# Patient Record
Sex: FEMALE | Race: BLACK OR AFRICAN AMERICAN | NOT HISPANIC OR LATINO | URBAN - METROPOLITAN AREA
[De-identification: names, ages, dates, MRNs, and addresses within clinical notes are randomized per-mention and may not be internally consistent; named-entity substitution may affect disease eponyms.]

---

## 2023-01-10 ENCOUNTER — HOSPITAL ENCOUNTER (EMERGENCY)
Facility: HOSPITAL | Age: 43
Discharge: HOME | End: 2023-01-11
Attending: EMERGENCY MEDICINE
Payer: MEDICAID

## 2023-01-10 DIAGNOSIS — N93.8 DUB (DYSFUNCTIONAL UTERINE BLEEDING): Primary | ICD-10-CM

## 2023-01-10 DIAGNOSIS — D50.0 IRON DEFICIENCY ANEMIA DUE TO CHRONIC BLOOD LOSS: ICD-10-CM

## 2023-01-10 DIAGNOSIS — N92.1 MENOMETRORRHAGIA: ICD-10-CM

## 2023-01-10 PROCEDURE — 80047 BASIC METABLC PNL IONIZED CA: CPT

## 2023-01-10 PROCEDURE — 99283 EMERGENCY DEPT VISIT LOW MDM: CPT

## 2023-01-10 PROCEDURE — 99284 PR EMERGENCY DEPT VISIT,LEVEL IV: ICD-10-PCS | Mod: ,,, | Performed by: EMERGENCY MEDICINE

## 2023-01-10 PROCEDURE — 99284 EMERGENCY DEPT VISIT MOD MDM: CPT | Mod: ,,, | Performed by: EMERGENCY MEDICINE

## 2023-01-10 PROCEDURE — 81025 URINE PREGNANCY TEST: CPT | Performed by: EMERGENCY MEDICINE

## 2023-01-10 PROCEDURE — 85025 COMPLETE CBC W/AUTO DIFF WBC: CPT | Performed by: EMERGENCY MEDICINE

## 2023-01-11 VITALS
WEIGHT: 185 LBS | RESPIRATION RATE: 18 BRPM | SYSTOLIC BLOOD PRESSURE: 163 MMHG | TEMPERATURE: 100 F | DIASTOLIC BLOOD PRESSURE: 104 MMHG | OXYGEN SATURATION: 98 % | HEIGHT: 64 IN | HEART RATE: 70 BPM | BODY MASS INDEX: 31.58 KG/M2

## 2023-01-11 LAB
B-HCG UR QL: NEGATIVE
BASOPHILS # BLD AUTO: 0.04 K/UL (ref 0–0.2)
BASOPHILS NFR BLD: 0.4 % (ref 0–1.9)
BUN SERPL-MCNC: 9 MG/DL (ref 6–30)
CHLORIDE SERPL-SCNC: 102 MMOL/L (ref 95–110)
CREAT SERPL-MCNC: 0.7 MG/DL (ref 0.5–1.4)
CTP QC/QA: YES
DIFFERENTIAL METHOD: ABNORMAL
EOSINOPHIL # BLD AUTO: 0.1 K/UL (ref 0–0.5)
EOSINOPHIL NFR BLD: 1.1 % (ref 0–8)
ERYTHROCYTE [DISTWIDTH] IN BLOOD BY AUTOMATED COUNT: 18.9 % (ref 11.5–14.5)
GLUCOSE SERPL-MCNC: 106 MG/DL (ref 70–110)
HCT VFR BLD AUTO: 26.6 % (ref 37–48.5)
HCT VFR BLD CALC: 29 %PCV (ref 36–54)
HGB BLD-MCNC: 7.6 G/DL (ref 12–16)
IMM GRANULOCYTES # BLD AUTO: 0.03 K/UL (ref 0–0.04)
IMM GRANULOCYTES NFR BLD AUTO: 0.3 % (ref 0–0.5)
LYMPHOCYTES # BLD AUTO: 2.9 K/UL (ref 1–4.8)
LYMPHOCYTES NFR BLD: 31.2 % (ref 18–48)
MCH RBC QN AUTO: 19.4 PG (ref 27–31)
MCHC RBC AUTO-ENTMCNC: 28.6 G/DL (ref 32–36)
MCV RBC AUTO: 68 FL (ref 82–98)
MONOCYTES # BLD AUTO: 0.5 K/UL (ref 0.3–1)
MONOCYTES NFR BLD: 5.8 % (ref 4–15)
NEUTROPHILS # BLD AUTO: 5.7 K/UL (ref 1.8–7.7)
NEUTROPHILS NFR BLD: 61.2 % (ref 38–73)
NRBC BLD-RTO: 0 /100 WBC
PLATELET # BLD AUTO: 225 K/UL (ref 150–450)
PMV BLD AUTO: 10.2 FL (ref 9.2–12.9)
POC IONIZED CALCIUM: 1.22 MMOL/L (ref 1.06–1.42)
POC TCO2 (MEASURED): 26 MMOL/L (ref 23–29)
POTASSIUM BLD-SCNC: 3.4 MMOL/L (ref 3.5–5.1)
RBC # BLD AUTO: 3.91 M/UL (ref 4–5.4)
SAMPLE: ABNORMAL
SODIUM BLD-SCNC: 138 MMOL/L (ref 136–145)
WBC # BLD AUTO: 9.25 K/UL (ref 3.9–12.7)

## 2023-01-11 RX ORDER — FERROUS SULFATE 325(65) MG
325 TABLET, DELAYED RELEASE (ENTERIC COATED) ORAL
Qty: 90 TABLET | Refills: 0 | Status: SHIPPED | OUTPATIENT
Start: 2023-01-11 | End: 2023-02-10

## 2023-01-11 NOTE — DISCHARGE INSTRUCTIONS
Please follow-up with gyn for further evaluation regarding your abnormal bleeding.  Return to emergency department if you began feeling lightheaded, dizzy as if you are going to pass out.

## 2023-01-11 NOTE — ED NOTES
Adult Physical Assessment  LOC: Raheem Hernandes, 42 y.o. female verified via two identifiers.  The patient is awake, alert, oriented and speaking appropriately at this time.  APPEARANCE: Patient resting comfortably and appears to be in no acute distress at this time. Patient is clean and well groomed, patient's clothing is properly fastened.  SKIN:The skin is warm and dry, color consistent with ethnicity, patient has normal skin turgor and moist mucus membranes, skin intact, no breakdown or brusing noted.  MUSCULOSKELETAL: Patient moving all extremities well, no obvious swelling or deformities noted.  RESPIRATORY: Airway is open and patent, respirations are spontaneous, patient has a normal effort and rate, no accessory muscle use noted.  CARDIAC: Patient has a normal rate and rhythm, no periphreal edema noted in any extremity, capillary refill < 3 seconds in all extremities  ABDOMEN: Soft and non tender to palpation, no abdominal distention noted. Bowel sounds present in all four quadrants. Pt c/o vaginal bleeding.   NEUROLOGIC: Eyes open spontaneously, behavior appropriate to situation, follows commands, facial expression symmetrical, bilateral hand grasp equal and even, purposeful motor response noted, normal sensation in all extremities when touched with a finger.

## 2023-01-11 NOTE — ED TRIAGE NOTES
Raheem Hernandes, an 42 y.o. female presents to the ED for vaginal bleeding that began today. Has since saturated 5 pads. Menstral cycle began around DINH.       Review of patient's allergies indicates:  No Known Allergies  Chief Complaint   Patient presents with    Vaginal Bleeding     Vaginal bleeding that started today. Saturated about 5 pads since this morning. Pt reports starting her cycle around DINH.      No past medical history on file.

## 2023-01-11 NOTE — ED PROVIDER NOTES
Encounter Date: 1/10/2023    SCRIBE #1 NOTE: I, Ashley Solomon, am scribing for, and in the presence of,  Peg Chapman MD. I have scribed the following portions of the note - Other sections scribed: HPI, ROS, PE.     History     Chief Complaint   Patient presents with    Vaginal Bleeding     Vaginal bleeding that started today. Saturated about 5 pads since this morning. Pt reports starting her cycle around DINH.      Raheem Hernandes is a 42 y.o. female with no significant past medical history presents with a complaint of  vaginal bleeding for the past 10 days, which has been constant. She affirms passing clots, and  experiencing a heavier menstrual cycle than normal. She tried using a pad to control the bleeding, with no improvement. Patient saturated 5 pads this morning. No other exacerbating or alleviating factors. Patient denies shortness of breath, lightheadedness or other associated symptoms. Denies possible pregnancy or taking hormone therapy. LMP 12/31    The history is provided by the patient and medical records. No  was used.     Review of patient's allergies indicates:  No Known Allergies  No past medical history on file.  No past surgical history on file.  No family history on file.     Review of Systems   Constitutional:  Negative for fever.   HENT:  Negative for sore throat.    Respiratory:  Negative for shortness of breath.    Cardiovascular:  Negative for chest pain.   Gastrointestinal:  Negative for nausea.   Genitourinary:  Positive for vaginal discharge. Negative for dysuria.   Musculoskeletal:  Negative for back pain.   Skin:  Negative for rash.   Neurological:  Negative for weakness and light-headedness.   Hematological:  Does not bruise/bleed easily.     Physical Exam     Initial Vitals [01/10/23 2147]   BP Pulse Resp Temp SpO2   (!) 172/79 88 15 99.7 °F (37.6 °C) 100 %      MAP       --         Physical Exam    Nursing note and vitals reviewed.  Constitutional: She appears  well-developed and well-nourished. She is not diaphoretic. No distress.   HENT:   Head: Normocephalic and atraumatic.   Mouth/Throat: Oropharynx is clear and moist.   Eyes: Conjunctivae are normal.   Neck: Neck supple. No JVD present.   Normal range of motion.  Cardiovascular:  Normal rate, regular rhythm and intact distal pulses.           Pulmonary/Chest: Breath sounds normal. No respiratory distress. She has no wheezes. She has no rhonchi. She has no rales.   Abdominal: Abdomen is soft. She exhibits no distension. There is no abdominal tenderness.   Genitourinary:    No vaginal bleeding.   No bleeding in the vagina.    Genitourinary Comments: Mild vaginal bleeding. No large clots. Cervix appears normal.      Musculoskeletal:         General: No tenderness or edema. Normal range of motion.      Cervical back: Normal range of motion and neck supple.     Neurological: She is alert and oriented to person, place, and time.   Skin: Skin is warm and dry.       ED Course   Procedures  Labs Reviewed   CBC W/ AUTO DIFFERENTIAL - Abnormal; Notable for the following components:       Result Value    RBC 3.91 (*)     Hemoglobin 7.6 (*)     Hematocrit 26.6 (*)     MCV 68 (*)     MCH 19.4 (*)     MCHC 28.6 (*)     RDW 18.9 (*)     All other components within normal limits   ISTAT PROCEDURE - Abnormal; Notable for the following components:    POC Potassium 3.4 (*)     POC Hematocrit 29 (*)     All other components within normal limits   POCT URINE PREGNANCY   ISTAT CHEM8          Imaging Results    None          Medications - No data to display  Medical Decision Making:   History:   Old Medical Records: I decided to obtain old medical records.  Initial Assessment:   Emergent evaluation a 42-year-old female presenting today for prolonged, irregular vaginal bleeding.  Hypertensive otherwise vital signs stable  Denies weakness, dizziness, lightheadedness, shortness of breath  Differential Diagnosis:   Severe anemia requiring  transfusion, symptomatic anemia, iron deficiency anemia, dysfunctional uterine bleeding.    Clinical Tests:   Lab Tests: Ordered and Reviewed  ED Management:  - Labs        Scribe Attestation:   Scribe #1: I performed the above scribed service and the documentation accurately describes the services I performed. I attest to the accuracy of the note.      ED Course as of 01/11/23 0036   Wed Jan 11, 2023   0022 Hemoglobin(!): 7.6 [GM]   0024 MCV(!): 68 [GM]   0025 POC BUN: 9 [GM]   0025 POC Creatinine: 0.7 [GM]      ED Course User Index  [GM] Peg Chapman MD        Labs reviewed, no white count.  Hemoglobin 7.6, MCV 68 consistent with microcytic anemia.  No previous labs for comparison.  I-STAT otherwise unremarkable.  She denies any symptoms of anemia at this time.  I do not think she requires an emergent transfusion at this time.  I recommended she follow-up with gyn within the next week for further evaluation.  She expressed understanding.  Patient given strict return precautions.     I Peg Chapman have personally performed the services described in this documentation by the scribe, in my presence, and it is both accurate and complete.    Clinical Impression:   Final diagnoses:  [N93.8] DUB (dysfunctional uterine bleeding) (Primary)  [D50.0] Iron deficiency anemia due to chronic blood loss  [N92.1] Menometrorrhagia        ED Disposition Condition    Discharge Stable          ED Prescriptions       Medication Sig Dispense Start Date End Date Auth. Provider    ferrous sulfate 325 (65 FE) MG EC tablet Take 1 tablet (325 mg total) by mouth 3 (three) times daily with meals. 90 tablet 1/11/2023 2/10/2023 Peg Chapman MD          Follow-up Information       Follow up With Specialties Details Why Contact Info      Call   Please follow-up with gyn regarding dysfunctional uterine bleeding.             Peg Chapman MD  01/11/23 1213

## 2023-01-11 NOTE — ED NOTES
I-STAT Chem-8+ Results:   Value Reference Range   Sodium 138 136-145 mmol/L   Potassium  3.4 3.5-5.1 mmol/L   Chloride 102  mmol/L   Ionized Calcium 1.22 1.06-1.42 mmol/L   CO2 (measured) 26 23-29 mmol/L   Glucose 106  mg/dL   BUN 9 6-30 mg/dL   Creatinine 0.7 0.5-1.4 mg/dL   Hematocrit 29 36-54%

## 2023-07-02 ENCOUNTER — HOSPITAL ENCOUNTER (EMERGENCY)
Facility: HOSPITAL | Age: 43
Discharge: LEFT BEFORE DISCHARGE | End: 2023-07-02
Attending: EMERGENCY MEDICINE
Payer: MEDICAID

## 2023-07-02 VITALS
OXYGEN SATURATION: 99 % | WEIGHT: 185 LBS | SYSTOLIC BLOOD PRESSURE: 135 MMHG | BODY MASS INDEX: 31.58 KG/M2 | HEIGHT: 64 IN | DIASTOLIC BLOOD PRESSURE: 79 MMHG | RESPIRATION RATE: 16 BRPM | HEART RATE: 77 BPM | TEMPERATURE: 99 F

## 2023-07-02 DIAGNOSIS — I82.4Y1 ACUTE DEEP VEIN THROMBOSIS (DVT) OF PROXIMAL VEIN OF RIGHT LOWER EXTREMITY: ICD-10-CM

## 2023-07-02 DIAGNOSIS — I26.99 PULMONARY EMBOLISM, UNSPECIFIED CHRONICITY, UNSPECIFIED PULMONARY EMBOLISM TYPE, UNSPECIFIED WHETHER ACUTE COR PULMONALE PRESENT: Primary | ICD-10-CM

## 2023-07-02 LAB
ANION GAP SERPL CALC-SCNC: 9 MMOL/L (ref 8–16)
BUN SERPL-MCNC: 10 MG/DL (ref 6–20)
CALCIUM SERPL-MCNC: 9 MG/DL (ref 8.7–10.5)
CHLORIDE SERPL-SCNC: 107 MMOL/L (ref 95–110)
CO2 SERPL-SCNC: 22 MMOL/L (ref 23–29)
CREAT SERPL-MCNC: 0.7 MG/DL (ref 0.5–1.4)
EST. GFR  (NO RACE VARIABLE): >60 ML/MIN/1.73 M^2
GLUCOSE SERPL-MCNC: 119 MG/DL (ref 70–110)
POTASSIUM SERPL-SCNC: 4.4 MMOL/L (ref 3.5–5.1)
SODIUM SERPL-SCNC: 138 MMOL/L (ref 136–145)

## 2023-07-02 PROCEDURE — 80048 BASIC METABOLIC PNL TOTAL CA: CPT | Performed by: EMERGENCY MEDICINE

## 2023-07-02 PROCEDURE — 25000003 PHARM REV CODE 250: Performed by: EMERGENCY MEDICINE

## 2023-07-02 PROCEDURE — 99283 EMERGENCY DEPT VISIT LOW MDM: CPT

## 2023-07-02 RX ADMIN — APIXABAN 10 MG: 5 TABLET, FILM COATED ORAL at 09:07

## 2023-07-02 NOTE — ED PROVIDER NOTES
"Encounter Date: 7/2/2023       History     Chief Complaint   Patient presents with    Deep Vein Thrombosis     Recent hospitalized at [facility] with PE and DVT. Was discharged home with Eloquis.  Took one dose Thursday and stopped. Pt states "I didn't knoe I had to keep taking them. Pt overheard telling registration that she stopped because she doesn't like taking medication. Pt returned today because of RLE swelling. Missed follow up appt because of her son's court appearance.     The patient is a 42-year-old female who presents to the emergency department with right lower extremity swelling.  She states that she noticed the swelling last weekend and went to the [facility] emergency department.  There, she was diagnosed with a right lower extremity DVT by ultrasound.  She also reported shortness of breath at the time.  CT scan confirmed a PE.  The patient was admitted to the hospital for 3 days and then discharged with a prescription for Eliquis.  She states that she took 1 dose and then stopped taking the medication.  She has not taken the medication in 4 days.  She presents here because her right lower extremity swelling is worse.  She denies any shortness of breath or chest pain.  She has no other complaints.    Review of patient's allergies indicates:  No Known Allergies  Past Medical History:   Diagnosis Date    DVT (deep venous thrombosis)     Other pulmonary embolism without acute cor pulmonale      History reviewed. No pertinent surgical history.  History reviewed. No pertinent family history.  Social History     Tobacco Use    Smoking status: Never    Smokeless tobacco: Never   Substance Use Topics    Alcohol use: Not Currently    Drug use: Never     Review of Systems  General: Denies generalized weakness.  Cardiovascular: Denies chest pain.  Denies shortness of breath.  Denies orthopnea.  Denies dyspnea on exertion.  Respiratory: Denies shortness of breath.  Denies wheezing.  Denies coughing.  GI: Denies " abdominal pain.  Denies melena.  Denies hematochezia.  : Denies dysuria.  Denies hematuria.    Skin: Denies rashes.  Denies lesions.  Denies pallor.  Denies bruising.  Musculoskeletal: Denies neck pain.  Denies back pain.  Denies extremity pain.  Reports mild right lower extremity swelling.      Physical Exam     Initial Vitals [07/02/23 0845]   BP Pulse Resp Temp SpO2   135/79 77 16 98.7 °F (37.1 °C) 99 %      MAP       --         Physical Exam  General: No apparent distress.  Well-nourished.  Well-developed.  Alert and oriented x3.  Cardiovascular: Regular rate and rhythm.  No murmurs, rubs, or gallops.  Brisk capillary fill.  2+ distal pulses.  Respiratory: Clear to auscultation bilaterally.  No wheezes, rales, or rhonchi.  No respiratory distress.  Abdomen: Soft.  Nontender.  Nondistended.  No guarding.  No rebound.  No masses.  No abdominal bruit auscultated.  Skin: No rashes.  No lesions.  No pallor.  No jaundice.  Musculoskeletal:  2+ right lower extremity pitting edema is noted.  No significant calf tenderness.  Negative Homans sign.  No palpable cord.      ED Course   Procedures  Labs Reviewed   BASIC METABOLIC PANEL - Abnormal; Notable for the following components:       Result Value    CO2 22 (*)     Glucose 119 (*)     All other components within normal limits   POCT URINE PREGNANCY          Imaging Results    None          Medications   apixaban tablet 10 mg (10 mg Oral Given 7/2/23 0952)     Medical Decision Making:   Initial Assessment:   This is an emergent evaluation.  The patient has a known PE and DVT.  She has been noncompliant with her Eliquis over the past 4 days.  She presents with worsening right lower extremity swelling.  Unfortunately, we do not have the patient's medical records in our EHR.  I will check a renal function and restart Eliquis.  Because the patient has no worsening chest pain or shortness of breath, I do not feel strongly that repeat imaging studies are necessary to assess  for PE.  She is neurovascularly intact distally.  I do not feel strongly that right lower extremity ultrasound is warranted.  If the renal function is normal, I will provide the patient with a dose of Eliquis 10 mg orally and discharge her with instructions to continue taking her Eliquis as prescribed.  ED Management:  10:18 a.m.   Renal function is within normal limits.  Once the UPT results as negative, the patient will be discharged.  She has been instructed to take Eliquis as prescribed.  I will give a dose of 10 mg orally now.                        Clinical Impression:   Final diagnoses:  [I26.99] Pulmonary embolism, unspecified chronicity, unspecified pulmonary embolism type, unspecified whether acute cor pulmonale present (Primary)  [I82.4Y1] Acute deep vein thrombosis (DVT) of proximal vein of right lower extremity        ED Disposition Condition    Discharge Stable          ED Prescriptions    None       Follow-up Information       Follow up With Specialties Details Why Contact Info Additional Information    [facility] Internal Medicine In 1 week or your PCP or a Community Clinic [address]  [facility] [id number] [phone] [facility] Please park in surface lot and check in at central registration desk             Louie Gallagher MD  07/02/23 2403

## 2023-07-02 NOTE — DISCHARGE INSTRUCTIONS
Continue taking the medication that was prescribed to you by the physician at [facility].

## 2023-07-02 NOTE — ED NOTES
"Pt left premises without providing urine sample. Pt stated she did not want to because "my tubes are tied" despite being told how pregnancy can still happen and requesting a urine sample the patient left. Iv was removed  "

## 2023-07-02 NOTE — ED TRIAGE NOTES
"Recent hospitalized at [facility] with PE and DVT. Was discharged home with Eloquis. Took one dose Thursday and stopped. Pt states "I didn't knoe I had to keep taking them. Pt overheard telling registration that she stopped because she doesn't like taking medication. Pt returned today because of RLE swelling. Missed follow up appt because of her son's court appearance.   "

## 2023-07-02 NOTE — ED NOTES
Patient identifiers verified and correct for Raheem Hernandes   LOC: The patient is awake, alert and aware of environment with an appropriate affect, the patient is oriented x 3 and speaking appropriately.   APPEARANCE: Patient appears comfortable and in no acute distress, patient is clean and well groomed.  SKIN: The skin is warm and dry, color consistent with ethnicity, patient has normal skin turgor and moist mucus membranes, skin intact   MUSCULOSKELETAL: Patient moving all extremities spontaneously, swelling noted to right lower extremity.   RESPIRATORY: Airway is open and patent, respirations are spontaneous, patient has a normal effort and rate, no accessory muscle use noted, O2 sats noted at 97% on room air.  CARDIAC: Patient has a normal rate and regular rhythm, no edema noted, capillary refill < 3 seconds.   GASTRO: Soft and non tender to palpation, no distention noted, normoactive bowel sounds present in all four quadrants. Pt states bowel movements have been regular.  : Pt denies any pain or frequency with urination.  NEURO: Pt opens eyes spontaneously, behavior appropriate to situation, follows commands, facial expression symmetrical, bilateral hand grasp equal and even, purposeful motor response noted, normal sensation in all extremities when touched with a finger.

## 2024-01-11 ENCOUNTER — HOSPITAL ENCOUNTER (OUTPATIENT)
Facility: HOSPITAL | Age: 44
Discharge: LEFT BEFORE DISCHARGE | DRG: 607 | End: 2024-01-12
Attending: EMERGENCY MEDICINE | Admitting: STUDENT IN AN ORGANIZED HEALTH CARE EDUCATION/TRAINING PROGRAM
Payer: MEDICAID

## 2024-01-11 DIAGNOSIS — R07.9 CHEST PAIN: ICD-10-CM

## 2024-01-11 DIAGNOSIS — S90.562A INSECT BITE OF LEFT ANKLE, INITIAL ENCOUNTER: Primary | ICD-10-CM
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
## 2024-01-11 DIAGNOSIS — I80.9 THROMBOPHLEBITIS: ICD-10-CM

## 2024-01-11 DIAGNOSIS — L03.116 CELLULITIS OF LEFT FOOT: ICD-10-CM

## 2024-01-11 DIAGNOSIS — L03.116 CELLULITIS OF LEFT LOWER LEG: ICD-10-CM

## 2024-01-11 PROCEDURE — 96365 THER/PROPH/DIAG IV INF INIT: CPT

## 2024-01-11 PROCEDURE — 63600175 PHARM REV CODE 636 W HCPCS: Mod: JZ,JG

## 2024-01-11 PROCEDURE — 80053 COMPREHEN METABOLIC PANEL: CPT

## 2024-01-11 PROCEDURE — 99285 EMERGENCY DEPT VISIT HI MDM: CPT | Mod: 25

## 2024-01-11 PROCEDURE — G0378 HOSPITAL OBSERVATION PER HR: HCPCS

## 2024-01-11 PROCEDURE — 86803 HEPATITIS C AB TEST: CPT | Performed by: PHYSICIAN ASSISTANT

## 2024-01-11 PROCEDURE — 87389 HIV-1 AG W/HIV-1&-2 AB AG IA: CPT | Performed by: PHYSICIAN ASSISTANT

## 2024-01-11 PROCEDURE — 25000003 PHARM REV CODE 250

## 2024-01-11 PROCEDURE — 85025 COMPLETE CBC W/AUTO DIFF WBC: CPT

## 2024-01-11 PROCEDURE — 12000002 HC ACUTE/MED SURGE SEMI-PRIVATE ROOM

## 2024-01-11 PROCEDURE — 25500020 PHARM REV CODE 255: Performed by: EMERGENCY MEDICINE

## 2024-01-11 PROCEDURE — 96365 THER/PROPH/DIAG IV INF INIT: CPT | Mod: 59

## 2024-01-11 RX ORDER — CLINDAMYCIN PHOSPHATE 900 MG/50ML
900 INJECTION, SOLUTION INTRAVENOUS
Status: COMPLETED | OUTPATIENT
Start: 2024-01-11 | End: 2024-01-11

## 2024-01-11 RX ORDER — ACETAMINOPHEN 500 MG
1000 TABLET ORAL
Status: COMPLETED | OUTPATIENT
Start: 2024-01-11 | End: 2024-01-11

## 2024-01-11 RX ADMIN — ACETAMINOPHEN 1000 MG: 500 TABLET ORAL at 06:01

## 2024-01-11 RX ADMIN — IOHEXOL 75 ML: 350 INJECTION, SOLUTION INTRAVENOUS at 08:01

## 2024-01-11 RX ADMIN — CLINDAMYCIN IN 5 PERCENT DEXTROSE 900 MG: 18 INJECTION, SOLUTION INTRAVENOUS at 06:01

## 2024-01-11 NOTE — Clinical Note
Diagnosis: Cellulitis of left foot [[id number]]   Future Attending Provider: SIRISHA VANEGAS [[id number]]   Reason for IP Medical Treatment  (Clinical interventions that can only be accomplished in the IP setting? ) :: Extensive soft tissue swelling of lower leg, thrombocytopenia   I certify that Inpatient services for greater than or equal to 2 midnights are medically necessary:: Yes   Plans for Post-Acute care--if anticipated (pick the single best option):: A. No post acute care anticipated at this time

## 2024-01-11 NOTE — ED PROVIDER NOTES
Encounter Date: 1/11/2024       History     Chief Complaint   Patient presents with    Insect Bite     Pt states she was bitten by a spider yesterday on her left ankle.  Now having swelling     43 year old female with history of anemia and PE on Eliquis presents to the ED regarding an insect bite to left ankle onset yesterday. States she was in her car when she felt something sting/bite her but was unable to catch whatever insect it was. She states it itched at first but this has resolved. Reports the swelling and pain worsened today so came to ED for evaluation. Denies shortness of breath, throat swelling, fever, chills, or any other symptoms today.    The history is provided by the patient and medical records.     Review of patient's allergies indicates:  No Known Allergies  Past Medical History:   Diagnosis Date    DVT (deep venous thrombosis)     Other pulmonary embolism without acute cor pulmonale      No past surgical history on file.  No family history on file.  Social History     Tobacco Use    Smoking status: Never    Smokeless tobacco: Never   Substance Use Topics    Alcohol use: Not Currently    Drug use: Never     Review of Systems   Constitutional:  Negative for fever.   HENT:  Negative for sore throat.    Respiratory:  Negative for shortness of breath.    Cardiovascular:  Positive for leg swelling. Negative for chest pain.   Gastrointestinal:  Negative for nausea.   Genitourinary:  Negative for dysuria.   Musculoskeletal:  Negative for back pain.   Skin:  Positive for wound. Negative for rash.   Neurological:  Negative for weakness.   Hematological:  Does not bruise/bleed easily.       Physical Exam     Initial Vitals [01/11/24 1439]   BP Pulse Resp Temp SpO2   (!) 183/84 87 16 98.7 °F (37.1 °C) 98 %      MAP       --         Physical Exam    Vitals reviewed.  Constitutional: She appears well-developed and well-nourished. She is not diaphoretic. No distress.   HENT:   Head: Normocephalic and  atraumatic.   Cardiovascular:  Normal rate and intact distal pulses.           Pulmonary/Chest: No respiratory distress.   Musculoskeletal:      Comments: Wound noted overlying left lateral malleolus with yellow drainage. Edema to left foot and calf with induration     Neurological: She is alert and oriented to person, place, and time. She has normal strength. No sensory deficit.   Psychiatric: She has a normal mood and affect.               ED Course   Procedures  Labs Reviewed   CBC W/ AUTO DIFFERENTIAL - Abnormal; Notable for the following components:       Result Value    RBC 3.85 (*)     Hemoglobin 7.9 (*)     Hematocrit 26.6 (*)     MCV 69 (*)     MCH 20.5 (*)     MCHC 29.7 (*)     RDW 20.2 (*)     Platelets 80 (*)     Platelet Estimate Decreased (*)     All other components within normal limits   COMPREHENSIVE METABOLIC PANEL - Abnormal; Notable for the following components:    CO2 22 (*)     Calcium 8.5 (*)     Albumin 3.3 (*)     Alkaline Phosphatase 27 (*)     Anion Gap 4 (*)     All other components within normal limits   HIV 1 / 2 ANTIBODY    Narrative:     Release to patient->Immediate   HEPATITIS C ANTIBODY    Narrative:     Release to patient->Immediate   POCT URINE PREGNANCY          Imaging Results               CT Leg (Tibia-Fibula) Wtih Contrast Left (Final result)  Result time 01/11/24 21:38:13      Final result by Adriano Hayward MD (01/11/24 21:38:13)                   Impression:      Left Foot CT and Left Lower Leg (Tibia-Fibula) CT:    CT findings suggest a left femoropopliteal DVT, possibly on the basis of septic thrombophlebitis.  Consider sonography to confirm DVT and determinate its cephalic extent.    Extensive soft tissue changes in the LEFT FOOT and LEFT LOWER LEG, compatible skin and subcutaneous soft tissue infection.    No definite involvement of the muscle compartments is demonstrated.    No rim enhancing abscess or soft tissue emphysema.    Additional observations as detailed in  the body of the report.    This report was flagged in Epic as abnormal.      Electronically signed by: Adriano Mollybasilia  Date:    01/11/2024  Time:    21:38               Narrative:    EXAMINATION:  CT LEG (TIBIA-FIBULA) WITH CONTRAST LEFT; CT FOOT WITH CONTRAST LEFT    CLINICAL HISTORY:  Soft tissue mass, lower leg, deep;concerned for poss nec fas;    TECHNIQUE:  Helical CT scanning of the left tibia and fibula, and left foot, was performed from the level of the distal thigh through the toes, with intravenous administration of 75 mL Omnipaque 350.   imaging, axial images, and sagittal and coronal multiplanar two-dimensional reformations of the left tibia-fibula and left foot are submitted and reviewed.    COMPARISON:  None    FINDINGS:  Left Foot CT and Left Lower Leg (Tibia-Fibula) CT:    There is extensive subcutaneous soft tissue stranding, most profound in the anterior dorsal aspect of the left foot, lateral aspect of the left ankle, and anterolateral aspect of the left lower leg.  There is some edema tracking along the interface of the subcutaneous fat and underlying muscle.  However no definite intramuscular or intermuscular involvement is demonstrated.    No discrete rim enhancing soft tissue fluid collection, radiopaque foreign body, or soft tissue emphysema is demonstrated.  However, please be aware that the absence of visible soft tissue emphysema does not fully excluded necrotizing infection, because approximately 5-15% of necrotizing infections do not demonstrate visible gas on imaging studies.    At this time, CT demonstrates no evidence of septic arthritis, osteomyelitis, acute fracture deformity or dislocation in the visualized portion of the left lower extremity.    The left popliteal vein and visualized distal segment of the left femoral vein demonstrate luminal distention, wall thickening, and no substantial luminal enhancement (in contradistinction to the left greater saphenous vein, which is  well opacified on the same images).    Trace fluid in the suprapatellar recess of the left knee, and partially visualized suprapatellar recess of the right knee, possibly physiologic.    Mild degenerative changes, most apparent in the left knee.                                        CT Foot With Contrast Left (Final result)  Result time 01/11/24 21:38:13      Final result by Adriano Hayward MD (01/11/24 21:38:13)                   Impression:      Left Foot CT and Left Lower Leg (Tibia-Fibula) CT:    CT findings suggest a left femoropopliteal DVT, possibly on the basis of septic thrombophlebitis.  Consider sonography to confirm DVT and determinate its cephalic extent.    Extensive soft tissue changes in the LEFT FOOT and LEFT LOWER LEG, compatible skin and subcutaneous soft tissue infection.    No definite involvement of the muscle compartments is demonstrated.    No rim enhancing abscess or soft tissue emphysema.    Additional observations as detailed in the body of the report.    This report was flagged in Epic as abnormal.      Electronically signed by: Adriano Hayward  Date:    01/11/2024  Time:    21:38               Narrative:    EXAMINATION:  CT LEG (TIBIA-FIBULA) WITH CONTRAST LEFT; CT FOOT WITH CONTRAST LEFT    CLINICAL HISTORY:  Soft tissue mass, lower leg, deep;concerned for poss nec fas;    TECHNIQUE:  Helical CT scanning of the left tibia and fibula, and left foot, was performed from the level of the distal thigh through the toes, with intravenous administration of 75 mL Omnipaque 350.   imaging, axial images, and sagittal and coronal multiplanar two-dimensional reformations of the left tibia-fibula and left foot are submitted and reviewed.    COMPARISON:  None    FINDINGS:  Left Foot CT and Left Lower Leg (Tibia-Fibula) CT:    There is extensive subcutaneous soft tissue stranding, most profound in the anterior dorsal aspect of the left foot, lateral aspect of the left ankle, and anterolateral  aspect of the left lower leg.  There is some edema tracking along the interface of the subcutaneous fat and underlying muscle.  However no definite intramuscular or intermuscular involvement is demonstrated.    No discrete rim enhancing soft tissue fluid collection, radiopaque foreign body, or soft tissue emphysema is demonstrated.  However, please be aware that the absence of visible soft tissue emphysema does not fully excluded necrotizing infection, because approximately 5-15% of necrotizing infections do not demonstrate visible gas on imaging studies.    At this time, CT demonstrates no evidence of septic arthritis, osteomyelitis, acute fracture deformity or dislocation in the visualized portion of the left lower extremity.    The left popliteal vein and visualized distal segment of the left femoral vein demonstrate luminal distention, wall thickening, and no substantial luminal enhancement (in contradistinction to the left greater saphenous vein, which is well opacified on the same images).    Trace fluid in the suprapatellar recess of the left knee, and partially visualized suprapatellar recess of the right knee, possibly physiologic.    Mild degenerative changes, most apparent in the left knee.                                       Medications   clindamycin in D5W 900 mg/50 mL IVPB 900 mg (0 mg Intravenous Stopped 1/11/24 1919)   acetaminophen tablet 1,000 mg (1,000 mg Oral Given 1/11/24 1816)   iohexoL (OMNIPAQUE 350) injection 75 mL (75 mLs Intravenous Given 1/11/24 2007)     Medical Decision Making  Amount and/or Complexity of Data Reviewed  Labs: ordered. Decision-making details documented in ED Course.  Radiology: ordered. Decision-making details documented in ED Course.    Risk  OTC drugs.  Prescription drug management.  Decision regarding hospitalization.         APC / Resident Notes:   43 year old female with history of PE on Eliquis presents to the ED regarding an insect bite to left ankle onset  yesterday with left lower leg swelling. Hypertensive with other vitals stable. See physical exam findings above. Given quick onset of edema after insect bite, will obtain CT and give IV clindamycin with concerns for possible necrotizing fasciitis.  Lower suspicion for DVT as patient is on Eliquis.    My differential diagnoses include but are not limited to:   Cellulitis, DVT, necrotizing fasciitis, osteomyelitis    See ED course.  I have reviewed the patient's records and discussed with my supervising physician.       Attending Attestation:     Physician Attestation Statement for NP/PA:   I personally made/approved the management plan and take responsibility for the patient management.    Other NP/PA Attestation Additions:    History of Present Illness: This is a 43-year-old female who presents to the emergency department stating that yesterday when she was in her car she felt something sting her left lower extremity around her lateral malleolus.  It was acutely uncomfortable and since then she is noticed that she has a wound there that has been draining a serous appearing fluid intermittently.  She also has noted that since that time she has felt more swelling up into her more proximal left lower leg that includes much of her shin and calf.  She feels as though there is some mild redness in the area but it is primarily focal around the wound.  She has not had any fevers or chills.  No chest pain or shortness of breath.  No abdominal pain vomiting or diarrhea.  Her presentation is complicated by the fact that she has a history of DVT and PE and is on Eliquis.   Physical Exam: VS upon arrival: 183/84; 87; 16; afebrile; 98% room air.  Consititutional: Pt is awake, alert, and oriented x 4. Does not appear to be in any augustina distress.  HEENT: PERRL; EOMI; nares patent; op clear; mmm without lesions.  Neck: Supple with good ROM.  CV: Normal rate; regular rhythm; no mrg. Heart sounds normal. 2+ radials bilateral and  symmetric.  Respiratory: CTA bilaterally with no focal rales, ronchi, or wheezes.  GI: Abdomen soft, NTND. No rebound. No guarding. BS normal.  MSK:  The AP P note characterizes the left lower extremity with images documented.  You can see that there is an actual wound to the lateral lower leg around the lateral malleolus that is actively draining just a bit of serous material at this time.  There is also a warmth to the skin around that area that emanates a bit up the calf and the shin with some subtle redness.  There is no crepitus.  All compartments are soft.  She has 2+ dorsalis pedis pulses bilateral and symmetric.  She has 5/5 dorsiflexion and plantar flexion with positive EHL.  Neuro: MAEW.  Left lower extremity as above.  Skin:  Left lower extremity as above.     Medical Decision Making: The patient has an actual wound to the lateral aspect of her left lower leg near the ankle.  She felt an acute onset where she felt as though something might have stone or bit her in her car.  This certainly sounds less likely to be clot the way it evolved and with the fact that there is an actual wound that we can see that is documented in the images below.  We were more concerned that this may be an acute arthropod envenomation of some sort with a localized reaction and even a possibility that there could be some cellulitis in that area.  It certainly looks like there is.  We wanted to obtain laboratory studies and start with CT imaging of the left lower extremity to assure that we understood the extent of the soft tissue inflammation/infection.  We wanted to rule out necrotizing fasciitis which was low on my differential but given the acute nature of these events I did want to rule this out.  We went ahead and gave the patient antibiotics.  We will start with the CT imaging and laboratory studies and go from there.  I anticipate the patient is going to need admission at the minimum for cellulitis of the left lower  extremity given the fairly abrupt onset and extensive nature of the changes to the skin and soft tissue. We certainly may need to get an US as well to evaluate for DVT, however this was lower on my differential given the wound and the chain of events that occurred.  The patient's care was signed out to the oncoming ED physician with plans to work with the AP P to follow-up the CT imaging and then determine next steps.  She is in stable condition upon sign-out of care.    ED diagnosis:   1. Acute left lower extremity wound and cellulitis.  2. Above diagnosis complicated by known DVT/PE.    NOTE: Following sign out of care, CT resulted with both evidence of soft tissue infection/cellulitis AND DVT. Patient was admitted. Please add DVT LLE to acute diagnoses.     *I performed a face to face evaluation of the patient, and I personally made and approve the management plan for this patient and take responsibility for the patient management.*                   ED Course as of 01/12/24 0127   u Jan 11, 2024   2155 CT Leg (Tibia-Fibula) Wtih Contrast Left(!)  Impression:     Left Foot CT and Left Lower Leg (Tibia-Fibula) CT:     CT findings suggest a left femoropopliteal DVT, possibly on the basis of septic thrombophlebitis.  Consider sonography to confirm DVT and determinate its cephalic extent.     Extensive soft tissue changes in the LEFT FOOT and LEFT LOWER LEG, compatible skin and subcutaneous soft tissue infection.     No definite involvement of the muscle compartments is demonstrated.     No rim enhancing abscess or soft tissue emphysema. [KB]   Fri Jan 12, 2024   0009 Comprehensive metabolic panel(!)  Unremarkable [KB]   0010 Hemoglobin(!): 7.9  Baseline [KB]   0121 Platelet Count(!): 80  No active bleeding [KB]   0122 Given patient's extensive soft tissue swelling with left femoral popliteal DVT, will admit to Hospital Medicine for further workup and treatment.  Patient comfortable and agrees with plan. [KB]       ED Course User Index  [KB] Tere Romero PA-C                           Clinical Impression:  Final diagnoses:  [I80.9] Thrombophlebitis  [L03.116] Cellulitis of left lower leg  [S90.562A, W57.XXXA] Insect bite of left ankle, initial encounter (Primary)  [L03.116] Cellulitis of left foot          ED Disposition Condition    Admit Stable                Tere Romero PA-C  01/12/24 0127       Peggy Krishna MD  01/13/24 1924

## 2024-01-12 VITALS
HEART RATE: 68 BPM | BODY MASS INDEX: 25.95 KG/M2 | OXYGEN SATURATION: 99 % | SYSTOLIC BLOOD PRESSURE: 177 MMHG | TEMPERATURE: 98 F | RESPIRATION RATE: 18 BRPM | DIASTOLIC BLOOD PRESSURE: 87 MMHG | HEIGHT: 64 IN | WEIGHT: 152 LBS

## 2024-01-12 PROBLEM — Z86.711 HISTORY OF PULMONARY EMBOLUS (PE): Chronic | Status: ACTIVE | Noted: 2024-01-12

## 2024-01-12 PROBLEM — D69.6 THROMBOCYTOPENIA: Status: ACTIVE | Noted: 2024-01-12

## 2024-01-12 PROBLEM — D50.9 IRON DEFICIENCY ANEMIA: Status: ACTIVE | Noted: 2024-01-12
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
## 2024-01-12 PROBLEM — Z86.711 HISTORY OF PULMONARY EMBOLUS (PE): Status: ACTIVE | Noted: 2024-01-12

## 2024-01-12 LAB
ALBUMIN SERPL BCP-MCNC: 3.3 G/DL (ref 3.5–5.2)
ALP SERPL-CCNC: 27 U/L (ref 55–135)
ALT SERPL W/O P-5'-P-CCNC: 16 U/L (ref 10–44)
ANION GAP SERPL CALC-SCNC: 4 MMOL/L (ref 8–16)
ANION GAP SERPL CALC-SCNC: 8 MMOL/L (ref 8–16)
ANISOCYTOSIS BLD QL SMEAR: ABNORMAL
AST SERPL-CCNC: 19 U/L (ref 10–40)
BASOPHILS # BLD AUTO: 0.05 K/UL (ref 0–0.2)
BASOPHILS # BLD AUTO: 0.05 K/UL (ref 0–0.2)
BASOPHILS NFR BLD: 0.6 % (ref 0–1.9)
BASOPHILS NFR BLD: 0.7 % (ref 0–1.9)
BILIRUB SERPL-MCNC: 0.5 MG/DL (ref 0.1–1)
BUN SERPL-MCNC: 13 MG/DL (ref 6–20)
BUN SERPL-MCNC: 15 MG/DL (ref 6–20)
CALCIUM SERPL-MCNC: 8.4 MG/DL (ref 8.7–10.5)
CALCIUM SERPL-MCNC: 8.5 MG/DL (ref 8.7–10.5)
CHLORIDE SERPL-SCNC: 109 MMOL/L (ref 95–110)
CHLORIDE SERPL-SCNC: 110 MMOL/L (ref 95–110)
CO2 SERPL-SCNC: 22 MMOL/L (ref 23–29)
CO2 SERPL-SCNC: 22 MMOL/L (ref 23–29)
CREAT SERPL-MCNC: 0.6 MG/DL (ref 0.5–1.4)
CREAT SERPL-MCNC: 0.7 MG/DL (ref 0.5–1.4)
D DIMER PPP IA.FEU-MCNC: 4.3 MG/L FEU
DACRYOCYTES BLD QL SMEAR: ABNORMAL
DIFFERENTIAL METHOD BLD: ABNORMAL
DIFFERENTIAL METHOD BLD: ABNORMAL
EOSINOPHIL # BLD AUTO: 0.4 K/UL (ref 0–0.5)
EOSINOPHIL # BLD AUTO: 0.4 K/UL (ref 0–0.5)
EOSINOPHIL NFR BLD: 4.9 % (ref 0–8)
EOSINOPHIL NFR BLD: 5.2 % (ref 0–8)
ERYTHROCYTE [DISTWIDTH] IN BLOOD BY AUTOMATED COUNT: 20.1 % (ref 11.5–14.5)
ERYTHROCYTE [DISTWIDTH] IN BLOOD BY AUTOMATED COUNT: 20.2 % (ref 11.5–14.5)
EST. GFR  (NO RACE VARIABLE): >60 ML/MIN/1.73 M^2
EST. GFR  (NO RACE VARIABLE): >60 ML/MIN/1.73 M^2
FERRITIN SERPL-MCNC: 29 NG/ML (ref 20–300)
GIANT PLATELETS BLD QL SMEAR: PRESENT
GLUCOSE SERPL-MCNC: 91 MG/DL (ref 70–110)
GLUCOSE SERPL-MCNC: 93 MG/DL (ref 70–110)
HCT VFR BLD AUTO: 26.6 % (ref 37–48.5)
HCT VFR BLD AUTO: 26.9 % (ref 37–48.5)
HCV AB SERPL QL IA: NORMAL
HGB BLD-MCNC: 7.9 G/DL (ref 12–16)
HGB BLD-MCNC: 8 G/DL (ref 12–16)
HIV 1+2 AB+HIV1 P24 AG SERPL QL IA: NORMAL
HYPOCHROMIA BLD QL SMEAR: ABNORMAL
IMM GRANULOCYTES # BLD AUTO: 0.02 K/UL (ref 0–0.04)
IMM GRANULOCYTES # BLD AUTO: 0.04 K/UL (ref 0–0.04)
IMM GRANULOCYTES NFR BLD AUTO: 0.2 % (ref 0–0.5)
IMM GRANULOCYTES NFR BLD AUTO: 0.6 % (ref 0–0.5)
IRON SERPL-MCNC: 460 UG/DL (ref 30–160)
LYMPHOCYTES # BLD AUTO: 2.1 K/UL (ref 1–4.8)
LYMPHOCYTES # BLD AUTO: 2.8 K/UL (ref 1–4.8)
LYMPHOCYTES NFR BLD: 30.4 % (ref 18–48)
LYMPHOCYTES NFR BLD: 34.8 % (ref 18–48)
MAGNESIUM SERPL-MCNC: 1.9 MG/DL (ref 1.6–2.6)
MCH RBC QN AUTO: 20.5 PG (ref 27–31)
MCH RBC QN AUTO: 20.6 PG (ref 27–31)
MCHC RBC AUTO-ENTMCNC: 29.7 G/DL (ref 32–36)
MCHC RBC AUTO-ENTMCNC: 29.7 G/DL (ref 32–36)
MCV RBC AUTO: 69 FL (ref 82–98)
MCV RBC AUTO: 69 FL (ref 82–98)
MONOCYTES # BLD AUTO: 0.4 K/UL (ref 0.3–1)
MONOCYTES # BLD AUTO: 0.5 K/UL (ref 0.3–1)
MONOCYTES NFR BLD: 6 % (ref 4–15)
MONOCYTES NFR BLD: 6.1 % (ref 4–15)
NEUTROPHILS # BLD AUTO: 4 K/UL (ref 1.8–7.7)
NEUTROPHILS # BLD AUTO: 4.3 K/UL (ref 1.8–7.7)
NEUTROPHILS NFR BLD: 53.4 % (ref 38–73)
NEUTROPHILS NFR BLD: 57.1 % (ref 38–73)
NRBC BLD-RTO: 0 /100 WBC
NRBC BLD-RTO: 0 /100 WBC
OVALOCYTES BLD QL SMEAR: ABNORMAL
PATH REV BLD -IMP: NORMAL
PATH REV BLD -IMP: NORMAL
PHOSPHATE SERPL-MCNC: 3.4 MG/DL (ref 2.7–4.5)
PLATELET # BLD AUTO: 80 K/UL (ref 150–450)
PLATELET # BLD AUTO: 82 K/UL (ref 150–450)
PLATELET BLD QL SMEAR: ABNORMAL
PMV BLD AUTO: ABNORMAL FL (ref 9.2–12.9)
PMV BLD AUTO: ABNORMAL FL (ref 9.2–12.9)
POIKILOCYTOSIS BLD QL SMEAR: SLIGHT
POLYCHROMASIA BLD QL SMEAR: ABNORMAL
POTASSIUM SERPL-SCNC: 3.7 MMOL/L (ref 3.5–5.1)
POTASSIUM SERPL-SCNC: 4 MMOL/L (ref 3.5–5.1)
PROT SERPL-MCNC: 6.8 G/DL (ref 6–8.4)
RBC # BLD AUTO: 3.85 M/UL (ref 4–5.4)
RBC # BLD AUTO: 3.89 M/UL (ref 4–5.4)
SATURATED IRON: 110 % (ref 20–50)
SODIUM SERPL-SCNC: 136 MMOL/L (ref 136–145)
SODIUM SERPL-SCNC: 139 MMOL/L (ref 136–145)
TARGETS BLD QL SMEAR: ABNORMAL
TOTAL IRON BINDING CAPACITY: 419 UG/DL (ref 250–450)
TRANSFERRIN SERPL-MCNC: 283 MG/DL (ref 200–375)
WBC # BLD AUTO: 6.95 K/UL (ref 3.9–12.7)
WBC # BLD AUTO: 8.04 K/UL (ref 3.9–12.7)

## 2024-01-12 PROCEDURE — 96367 TX/PROPH/DG ADDL SEQ IV INF: CPT

## 2024-01-12 PROCEDURE — G0378 HOSPITAL OBSERVATION PER HR: HCPCS

## 2024-01-12 PROCEDURE — 82728 ASSAY OF FERRITIN: CPT

## 2024-01-12 PROCEDURE — 80048 BASIC METABOLIC PNL TOTAL CA: CPT

## 2024-01-12 PROCEDURE — 85060 BLOOD SMEAR INTERPRETATION: CPT | Mod: ,,, | Performed by: PATHOLOGY

## 2024-01-12 PROCEDURE — 25000003 PHARM REV CODE 250

## 2024-01-12 PROCEDURE — 63600175 PHARM REV CODE 636 W HCPCS

## 2024-01-12 PROCEDURE — 83735 ASSAY OF MAGNESIUM: CPT

## 2024-01-12 PROCEDURE — 85025 COMPLETE CBC W/AUTO DIFF WBC: CPT

## 2024-01-12 PROCEDURE — 83540 ASSAY OF IRON: CPT

## 2024-01-12 PROCEDURE — 12000002 HC ACUTE/MED SURGE SEMI-PRIVATE ROOM

## 2024-01-12 PROCEDURE — 84100 ASSAY OF PHOSPHORUS: CPT

## 2024-01-12 PROCEDURE — 85379 FIBRIN DEGRADATION QUANT: CPT | Performed by: STUDENT IN AN ORGANIZED HEALTH CARE EDUCATION/TRAINING PROGRAM

## 2024-01-12 RX ORDER — NALOXONE HCL 0.4 MG/ML
0.02 VIAL (ML) INJECTION
Status: DISCONTINUED | OUTPATIENT
Start: 2024-01-12 | End: 2024-01-12 | Stop reason: HOSPADM

## 2024-01-12 RX ORDER — IBUPROFEN 200 MG
24 TABLET ORAL
Status: DISCONTINUED | OUTPATIENT
Start: 2024-01-12 | End: 2024-01-12 | Stop reason: HOSPADM

## 2024-01-12 RX ORDER — ONDANSETRON 8 MG/1
8 TABLET, ORALLY DISINTEGRATING ORAL EVERY 8 HOURS PRN
Status: DISCONTINUED | OUTPATIENT
Start: 2024-01-12 | End: 2024-01-12 | Stop reason: HOSPADM

## 2024-01-12 RX ORDER — SODIUM CHLORIDE 0.9 % (FLUSH) 0.9 %
10 SYRINGE (ML) INJECTION EVERY 12 HOURS PRN
Status: DISCONTINUED | OUTPATIENT
Start: 2024-01-12 | End: 2024-01-12 | Stop reason: HOSPADM

## 2024-01-12 RX ORDER — ACETAMINOPHEN 325 MG/1
650 TABLET ORAL EVERY 8 HOURS PRN
Status: DISCONTINUED | OUTPATIENT
Start: 2024-01-12 | End: 2024-01-12 | Stop reason: HOSPADM

## 2024-01-12 RX ORDER — ACETAMINOPHEN 325 MG/1
650 TABLET ORAL EVERY 4 HOURS PRN
Status: DISCONTINUED | OUTPATIENT
Start: 2024-01-12 | End: 2024-01-12 | Stop reason: HOSPADM

## 2024-01-12 RX ORDER — FERROUS SULFATE 325(65) MG
325 TABLET, DELAYED RELEASE (ENTERIC COATED) ORAL
COMMUNITY

## 2024-01-12 RX ORDER — ONDANSETRON 2 MG/ML
4 INJECTION INTRAMUSCULAR; INTRAVENOUS EVERY 8 HOURS PRN
Status: DISCONTINUED | OUTPATIENT
Start: 2024-01-12 | End: 2024-01-12 | Stop reason: HOSPADM

## 2024-01-12 RX ORDER — IBUPROFEN 200 MG
16 TABLET ORAL
Status: DISCONTINUED | OUTPATIENT
Start: 2024-01-12 | End: 2024-01-12 | Stop reason: HOSPADM

## 2024-01-12 RX ORDER — GLUCAGON 1 MG
1 KIT INJECTION
Status: DISCONTINUED | OUTPATIENT
Start: 2024-01-12 | End: 2024-01-12 | Stop reason: HOSPADM

## 2024-01-12 RX ORDER — HYDROCODONE BITARTRATE AND ACETAMINOPHEN 5; 325 MG/1; MG/1
1 TABLET ORAL EVERY 6 HOURS PRN
Status: DISCONTINUED | OUTPATIENT
Start: 2024-01-12 | End: 2024-01-12 | Stop reason: HOSPADM

## 2024-01-12 RX ADMIN — VANCOMYCIN HYDROCHLORIDE 1500 MG: 1.5 INJECTION, POWDER, LYOPHILIZED, FOR SOLUTION INTRAVENOUS at 03:01

## 2024-01-12 RX ADMIN — CEFEPIME 2 G: 2 INJECTION, POWDER, FOR SOLUTION INTRAVENOUS at 05:01

## 2024-01-12 RX ADMIN — APIXABAN 5 MG: 5 TABLET, FILM COATED ORAL at 08:01

## 2024-01-12 NOTE — PROGRESS NOTES
"Pharmacokinetic Initial Assessment: IV Vancomycin    Assessment/Plan:    Initiate intravenous vancomycin with loading dose of 1500 mg once followed by a maintenance dose of vancomycin 1000 mg IV every 12 hours  Desired empiric serum trough concentration is 10 to 15 mcg/mL  Draw vancomycin trough level 60 min prior to fourth dose on 1/13/24 at approximately 15:00 or sooner if clinically indicated  Pharmacy will continue to follow and monitor vancomycin.      Please contact pharmacy at extension [phone] with any questions regarding this assessment.     Thank you for the consult,   Laurent Barrios       Patient brief summary:  Raheem Hernandes is a 43 y.o. female initiated on antimicrobial therapy with IV Vancomycin for treatment of suspected skin & soft tissue infection    Drug Allergies:   Review of patient's allergies indicates:  No Known Allergies    Actual Body Weight:   68.9 kg    Renal Function:   Estimated Creatinine Clearance: 116.5 mL/min (based on SCr of 0.6 mg/dL).,     Dialysis Method (if applicable):  N/A    CBC (last 72 hours):  Recent Labs   Lab Result Units 01/11/24  2328   WBC K/uL 8.04   Hemoglobin g/dL 7.9*   Hematocrit % 26.6*   Platelets K/uL 80*   Gran % % 53.4   Lymph % % 34.8   Mono % % 6.1   Eosinophil % % 4.9   Basophil % % 0.6   Differential Method  Automated       Metabolic Panel (last 72 hours):  Recent Labs   Lab Result Units 01/11/24  2328   Sodium mmol/L 136   Potassium mmol/L 4.0   Chloride mmol/L 110   CO2 mmol/L 22*   Glucose mg/dL 91   BUN mg/dL 15   Creatinine mg/dL 0.6   Albumin g/dL 3.3*   Total Bilirubin mg/dL 0.5   Alkaline Phosphatase U/L 27*   AST U/L 19   ALT U/L 16       Drug levels (last 3 results):  No results for input(s): "VANCOMYCINRA", "VANCORANDOM", "VANCOMYCINPE", "VANCOPEAK", "VANCOMYCINTR", "VANCOTROUGH" in the last 72 hours.    Microbiologic Results:  Microbiology Results (last 7 days)       Procedure Component Value Units Date/Time    Aerobic culture [[id number]]     Order " Status: No result Specimen: Wound from Ankle, Left     Culture, Anaerobe [[id number]]     Order Status: No result Specimen: Skin from Ankle, Left     Fungus culture [[id number]]     Order Status: No result Specimen: Wound from Ankle, Left

## 2024-01-12 NOTE — ASSESSMENT & PLAN NOTE
Patient diagnosed with multilobar pulmonary emboli in October, thought to be unprovoked. Reports adherence to home eliquis regimen.  Hypercoagulable workup never initiated. Notable R>L swelling of lower extremities on admission. CT with femoropopliteal DVT; possibly septic thrombophlebitis, more likely chronic DVT. Previous LE US from Oct 2023 positive for L-sided posterior tibial DVT.     -continue home eliquis  -consider lower extremity US to assess clot burden

## 2024-01-12 NOTE — HPI
43F PMH PE on Eliquis, CHIDI presents for swelling and pain of her LLE. Reports an insect bite to her L lateral ankle on Wednesday afternoon. She was unable to verify what exactly bit her, but describes the initial pain as similar to an ant bite. Patient reports the wound became progressively itchy that night and then noticed significant swelling as of Thursday AM, prompting her visit to the ED. Denies fever, chills, shortness of breath, wheezing, numbness, tingling, pain with ambulation. At baseline, patient reports swelling of bilateral LE, L > R.     In the ED, patient afebrile and hemodynamically stable. Notable workup includes Hgb 7.9, WBC 8, Plt 80. CT of left leg/foot notable for significant soft tissue swelling; no abscess or emphysematous changes appreciated. L femoropopliteal DVT seen as well. Admitted to  for further management.

## 2024-01-12 NOTE — ASSESSMENT & PLAN NOTE
Platelet count found to be 80 on admission. No evidence suggestive of acute bleeding. No petechiae, purpura, ecchymosis appreciated. No known history of thrombocytopenia or requiring platelet transfusions. Denies hematuria, melena, hematochezia, menorrhagia. Differentials include lab error, consumption, or depletion 2/2 infection.     -blood smear  -daily CBC  -hold DVT ppx if < 50

## 2024-01-12 NOTE — NURSING
Report received from off going nurse. Patient AAO. No signs of distress noted. Call light in reach. Bed low and locked. Will continue plan of care.

## 2024-01-12 NOTE — DISCHARGE SUMMARY
[facility] Dept  Hospital Medicine  Discharge Summary      Patient Name: Raheem Hernandes  MRN: [id number]  HARMAN: [id number]  Patient Class: IP- Inpatient  Admission Date: 1/11/2024  Hospital Length of Stay: 1 days  Discharge Date and Time:  01/12/2024 4:39 PM  Attending Physician: Marcela att. providers found   Discharging Provider: Montana Lam DO  Primary Care Provider: Marcela Primary Doctor  [facility] Medicine Team: [facility] MED 5 Montana Lam DO  Primary Care Team: [facility] HOS[facility] MED 5    HPI:   43F PMH PE on Eliquis, CHIDI presents for swelling and pain of her LLE. Reports an insect bite to her L lateral ankle on Wednesday afternoon. She was unable to verify what exactly bit her, but describes the initial pain as similar to an ant bite. Patient reports the wound became progressively itchy that night and then noticed significant swelling as of Thursday AM, prompting her visit to the ED. Denies fever, chills, shortness of breath, wheezing, numbness, tingling, pain with ambulation. At baseline, patient reports swelling of bilateral LE, L > R.     In the ED, patient afebrile and hemodynamically stable. Notable workup includes Hgb 7.9, WBC 8, Plt 80. CT of left leg/foot notable for significant soft tissue swelling; no abscess or emphysematous changes appreciated. L femoropopliteal DVT seen as well. Admitted to  for further management.        * No surgery found *      Hospital Course:   Patient admitted for possible infected insect bite to her left lower extremity and was started on cefepime and vanc. CT of LLE concerning for left femoropopliteal DVT possibly septic thrombophlebitis.  DVT ultrasound was ordered showing occlusive thrombus at the level of the left distal femoral vein and left popliteal vein. Patient pending work up for further management. Unsure if patient was compliant with home apixaban. Patient had severe microcytic anemia that was under work up. After patient was getting LE ultrasound patient eloped. Attempted  "to call the patient three times, but did not  the phone. Later attempted to call the patient and her brother picked up. He is unsure where she is at.      Goals of Care Treatment Preferences:  Code Status: Full Code    BP (!) 177/87 (BP Location: Right arm, Patient Position: Sitting)   Pulse 68   Temp 98.4 °F (36.9 °C) (Oral)   Resp 18   Ht 5' 4" (1.626 m)   Wt 68.9 kg (152 lb)   SpO2 99%   BMI 26.09 kg/m²        Consults:       Final Active Diagnoses:    Diagnosis Date Noted POA    PRINCIPAL PROBLEM:  Infected insect bite [W57.XXXA] 01/12/2024 Yes    History of pulmonary embolus (PE) [Z86.711] 01/12/2024 Yes     Chronic    Thrombocytopenia [D69.6] 01/12/2024 Yes    Iron deficiency anemia [D50.9] 01/12/2024 Yes      Problems Resolved During this Admission:       Discharged Condition: fair    Disposition: Eloped    Follow Up:    Patient Instructions:   No discharge procedures on file.    Significant Diagnostic Studies: Labs: CMP   Recent Labs   Lab 01/11/24 2328 01/12/24  0326    139   K 4.0 3.7    109   CO2 22* 22*   GLU 91 93   BUN 15 13   CREATININE 0.6 0.7   CALCIUM 8.5* 8.4*   PROT 6.8  --    ALBUMIN 3.3*  --    BILITOT 0.5  --    ALKPHOS 27*  --    AST 19  --    ALT 16  --    ANIONGAP 4* 8    and CBC   Recent Labs   Lab 01/11/24 2328 01/12/24  0326   WBC 8.04 6.95   HGB 7.9* 8.0*   HCT 26.6* 26.9*   PLT 80* 82*       Pending Diagnostic Studies:       None           Medications:  Reconciled Home Medications:      Medication List        ASK your doctor about these medications      apixaban 2.5 mg Tab  Commonly known as: ELIQUIS  Take 5 mg by mouth 2 (two) times daily.     ferrous sulfate 325 (65 FE) MG EC tablet  Take 325 mg by mouth every 48 hours.              Indwelling Lines/Drains at time of discharge:   Lines/Drains/Airways       None                   Time spent on the discharge of patient: EFRAIN Lam DO  Department of Hospital Medicine  [facility] - Emergency " Dept

## 2024-01-12 NOTE — SUBJECTIVE & OBJECTIVE
Past Medical History:   Diagnosis Date    DVT (deep venous thrombosis)     Other pulmonary embolism without acute cor pulmonale        No past surgical history on file.    Review of patient's allergies indicates:  No Known Allergies    No current facility-administered medications on file prior to encounter.     Current Outpatient Medications on File Prior to Encounter   Medication Sig    apixaban (ELIQUIS) 2.5 mg Tab Take 5 mg by mouth 2 (two) times daily.    ferrous sulfate 325 (65 FE) MG EC tablet Take 325 mg by mouth every 48 hours.     Family History    None       Tobacco Use    Smoking status: Never    Smokeless tobacco: Never   Substance and Sexual Activity    Alcohol use: Not Currently    Drug use: Never    Sexual activity: Not on file     Review of Systems   Constitutional:  Negative for activity change, chills, diaphoresis, fatigue and fever.   HENT:  Negative for sore throat and trouble swallowing.    Respiratory:  Negative for cough, chest tightness, shortness of breath, wheezing and stridor.    Cardiovascular:  Negative for chest pain and palpitations.   Gastrointestinal:  Negative for abdominal distention and abdominal pain.   Genitourinary:  Negative for dysuria and flank pain.   Musculoskeletal:  Negative for arthralgias, gait problem and myalgias.   Skin:  Positive for wound (clear, yellow drainage). Negative for color change.   Neurological:  Negative for dizziness, facial asymmetry and headaches.     Objective:     Vital Signs (Most Recent):  Temp: 98.1 °F (36.7 °C) (01/12/24 0228)  Pulse: 70 (01/12/24 0228)  Resp: 16 (01/12/24 0228)  BP: (!) 158/89 (01/12/24 0228)  SpO2: 98 % (01/12/24 0228) Vital Signs (24h Range):  Temp:  [98 °F (36.7 °C)-98.7 °F (37.1 °C)] 98.1 °F (36.7 °C)  Pulse:  [70-87] 70  Resp:  [16] 16  SpO2:  [98 %-99 %] 98 %  BP: (153-183)/(78-96) 158/89     Weight: 68.9 kg (152 lb)  Body mass index is 26.09 kg/m².     Physical Exam  Vitals and nursing note reviewed.   Constitutional:        General: She is not in acute distress.     Appearance: Normal appearance. She is not ill-appearing or toxic-appearing.   Eyes:      General: No scleral icterus.     Extraocular Movements: Extraocular movements intact.      Pupils: Pupils are equal, round, and reactive to light.   Cardiovascular:      Rate and Rhythm: Normal rate. Rhythm irregular.      Pulses: Normal pulses.      Heart sounds: Normal heart sounds. No murmur heard.  Pulmonary:      Effort: Pulmonary effort is normal. No respiratory distress.      Breath sounds: Normal breath sounds.   Abdominal:      General: Abdomen is flat. There is no distension.      Palpations: Abdomen is soft.      Tenderness: There is no abdominal tenderness.   Musculoskeletal:         General: Swelling (pitting edema of LLE (up to knee) and pedal aspect of foot) present. No tenderness.   Skin:     Findings: Lesion (ulcerated, indurated, fluctuant lesion of left lateral malleolus; clear, yellow drainage) present. No erythema.   Neurological:      Mental Status: She is alert.        1            Significant Labs: All pertinent labs within the past 24 hours have been reviewed.    Significant Imaging: I have reviewed all pertinent imaging results/findings within the past 24 hours.

## 2024-01-12 NOTE — ASSESSMENT & PLAN NOTE
43F presents for swelling and pain of her LLE. Reports an insect bite to her L lateral ankle on Wednesday afternoon with subsequent significant swelling, though no systemic symptoms. Pitting edema of LLE and mild swelling to RLE; no crepitus or significant discoloration appreciated. Grossly neurovascular intact distal to wound. Imaging with significant soft tissue swelling of left lower leg and foot; though no abscess or emphysematous findings. Possibe L femoropoplitaeal DVT seen as well, possibly septic thrombophlebitis. Low suspicion for necrotizing fasciitis at this time.    -broad spectrum abx for now: vanc/cefepime  -wound cultures: aerobe, anerobe, fungal  -PRN pain control  -monitor closely for progression of symptoms or development of signs suggestive of nec fasc; low threshold for surgery evaluation

## 2024-01-12 NOTE — ASSESSMENT & PLAN NOTE
43F PMH PE on Eliquis, CHIDI presents for swelling and pain of her LLE. Reports an insect bite to her L lateral ankle on Wednesday afternoon. She was unable to verify what exactly bit her, but describes the initial pain as similar to an ant bite. Wound initially itchy that night and then noticed significant swelling as of Thursday AM, prompting her visit to the ED. Denies systemic symptoms. Exam notable for pitting edema of LLE and mild swelling to RLE (patient reports left leg is typically more swollen than right); no crepitus or significant discoloration appreciated. Grossly neurovascular intact distal to wound. Imaging with significant soft tissue swelling of left lower leg and foot; though no abscess or emphysematous findings. Possibe L femoropoplitaeal DVT seen as well, possibly septic thrombophlebitis. Low suspicion for necrotizing fasciitis at this time.    -broad spectrum abx for now: vanc/cefepime  -wound cultures: aerobe, anerobe, fungal  -PRN pain control  -monitor closely for progression of symptoms or development of signs suggestive of nec fasc; low threshold for surgery evaluation

## 2024-01-12 NOTE — ASSESSMENT & PLAN NOTE
H/H on admission 7.9/26.6, and MCV <70. Established history of anemia, requiring multiple transfusions (most recently 2u in Oct 2023). Currently takes ferrous sulfate every other day. History of menorrhagia since starting Eliquis, though reports this has improved. No evidence of acute bleeding on exam. Denies hematuria, melena, hematochezia.    -iron studies  -continue home iron supplementation  -may require OBGYN referral upon discharge

## 2024-01-12 NOTE — HOSPITAL COURSE
Patient admitted for possible infected insect bite to her left lower extremity and was started on cefepime and vanc. CT of LLE concerning for left femoropopliteal DVT possibly septic thrombophlebitis.  DVT ultrasound was ordered showing occlusive thrombus at the level of the left distal femoral vein and left popliteal vein. Patient pending work up for further management. Unsure if patient was compliant with home apixaban. Patient had severe microcytic anemia that was under work up. After patient was getting LE ultrasound patient eloped. Attempted to call the patient three times, but did not  the phone. Later attempted to call the patient and her brother picked up. He is unsure where she is at.

## 2024-01-12 NOTE — NURSING
Nurse informed from information desk at front of hospital that patient has left. States she doesn't care that her personal belongings are still in the room she is ready to go. ED charge nurse made aware. Patient still has IV in her arm, was currently getting US that was scheduled and never returned to unit.     12:45p Patient relations at desk, returned patient cell phone to her.  Patient IV was removed by Kalie.Dr. Prado and Dr. Walsh messaged about this matter. Dr. Prado only read message.

## 2024-01-12 NOTE — H&P
[facility]  [facility]  History & Physical    Patient Name: Raheem Hernandes  MRN: [id number]  Patient Class: IP- Inpatient  Admission Date: 1/11/2024  Attending Physician: Silver Prado, *   Primary Care Provider: Marcela, Primary Doctor         Patient information was obtained from patient, past medical records, and ER records.     Subjective:     Principal Problem:Insect bite    Chief Complaint:   Chief Complaint   Patient presents with    Insect Bite     Pt states she was bitten by a spider yesterday on her left ankle.  Now having swelling        HPI: 43F PMH PE on Eliquis, CHIDI presents for swelling and pain of her LLE. Reports an insect bite to her L lateral ankle on Wednesday afternoon. She was unable to verify what exactly bit her, but describes the initial pain as similar to an ant bite. Patient reports the wound became progressively itchy that night and then noticed significant swelling as of Thursday AM, prompting her visit to the ED. Denies fever, chills, shortness of breath, wheezing, numbness, tingling, pain with ambulation. At baseline, patient reports swelling of bilateral LE, L > R.     In the ED, patient afebrile and hemodynamically stable. Notable workup includes Hgb 7.9, WBC 8, Plt 80. CT of left leg/foot notable for significant soft tissue swelling; no abscess or emphysematous changes appreciated. L femoropopliteal DVT seen as well. Admitted to  for further management.        Past Medical History:   Diagnosis Date    DVT (deep venous thrombosis)     Other pulmonary embolism without acute cor pulmonale        No past surgical history on file.    Review of patient's allergies indicates:  No Known Allergies    No current facility-administered medications on file prior to encounter.     Current Outpatient Medications on File Prior to Encounter   Medication Sig    apixaban (ELIQUIS) 2.5 mg Tab Take 5 mg by mouth 2 (two) times daily.    ferrous sulfate 325 (65 FE) MG EC tablet  Take 325 mg by mouth every 48 hours.     Family History    None       Tobacco Use    Smoking status: Never    Smokeless tobacco: Never   Substance and Sexual Activity    Alcohol use: Not Currently    Drug use: Never    Sexual activity: Not on file     Review of Systems   Constitutional:  Negative for activity change, chills, diaphoresis, fatigue and fever.   HENT:  Negative for sore throat and trouble swallowing.    Respiratory:  Negative for cough, chest tightness, shortness of breath, wheezing and stridor.    Cardiovascular:  Negative for chest pain and palpitations.   Gastrointestinal:  Negative for abdominal distention and abdominal pain.   Genitourinary:  Negative for dysuria and flank pain.   Musculoskeletal:  Negative for arthralgias, gait problem and myalgias.   Skin:  Positive for wound (clear, yellow drainage). Negative for color change.   Neurological:  Negative for dizziness, facial asymmetry and headaches.     Objective:     Vital Signs (Most Recent):  Temp: 98.1 °F (36.7 °C) (01/12/24 0228)  Pulse: 70 (01/12/24 0228)  Resp: 16 (01/12/24 0228)  BP: (!) 158/89 (01/12/24 0228)  SpO2: 98 % (01/12/24 0228) Vital Signs (24h Range):  Temp:  [98 °F (36.7 °C)-98.7 °F (37.1 °C)] 98.1 °F (36.7 °C)  Pulse:  [70-87] 70  Resp:  [16] 16  SpO2:  [98 %-99 %] 98 %  BP: (153-183)/(78-96) 158/89     Weight: 68.9 kg (152 lb)  Body mass index is 26.09 kg/m².     Physical Exam  Vitals and nursing note reviewed.   Constitutional:       General: She is not in acute distress.     Appearance: Normal appearance. She is not ill-appearing or toxic-appearing.   Eyes:      General: No scleral icterus.     Extraocular Movements: Extraocular movements intact.      Pupils: Pupils are equal, round, and reactive to light.   Cardiovascular:      Rate and Rhythm: Normal rate. Rhythm irregular.      Pulses: Normal pulses.      Heart sounds: Normal heart sounds. No murmur heard.  Pulmonary:      Effort: Pulmonary effort is normal. No  respiratory distress.      Breath sounds: Normal breath sounds.   Abdominal:      General: Abdomen is flat. There is no distension.      Palpations: Abdomen is soft.      Tenderness: There is no abdominal tenderness.   Musculoskeletal:         General: Swelling (pitting edema of LLE (up to knee) and pedal aspect of foot) present. No tenderness.   Skin:     Findings: Lesion (ulcerated, indurated, fluctuant lesion of left lateral malleolus; clear, yellow drainage) present. No erythema.   Neurological:      Mental Status: She is alert.        1            Significant Labs: All pertinent labs within the past 24 hours have been reviewed.    Significant Imaging: I have reviewed all pertinent imaging results/findings within the past 24 hours.  Assessment/Plan:     * Insect bite  43F presents for swelling and pain of her LLE. Reports an insect bite to her L lateral ankle on Wednesday afternoon with subsequent significant swelling, though no systemic symptoms. Pitting edema of LLE and mild swelling to RLE; no crepitus or significant discoloration appreciated. Grossly neurovascular intact distal to wound. Imaging with significant soft tissue swelling of left lower leg and foot; though no abscess or emphysematous findings. Possibe L femoropoplitaeal DVT seen as well, possibly septic thrombophlebitis. Low suspicion for necrotizing fasciitis at this time.    -broad spectrum abx for now: vanc/cefepime  -wound cultures: aerobe, anerobe, fungal  -PRN pain control  -monitor closely for progression of symptoms or development of signs suggestive of nec fasc; low threshold for surgery evaluation      Iron deficiency anemia  H/H on admission 7.9/26.6, and MCV <70. Established history of anemia, requiring multiple transfusions (most recently 2u in Oct 2023). Currently takes ferrous sulfate every other day. History of menorrhagia since starting Eliquis, though reports this has improved. No evidence of acute bleeding on exam. Denies  hematuria, melena, hematochezia.    -iron studies  -continue home iron supplementation  -may require OBGYN referral upon discharge    Thrombocytopenia  Platelet count found to be 80 on admission. No evidence suggestive of acute bleeding. No petechiae, purpura, ecchymosis appreciated. No known history of thrombocytopenia or requiring platelet transfusions. Denies hematuria, melena, hematochezia, menorrhagia. Differentials include lab error, consumption, or depletion 2/2 infection.     -blood smear  -daily CBC    History of pulmonary embolus (PE)  Patient diagnosed with multilobar pulmonary emboli in October, thought to be unprovoked. Reports adherence to home eliquis regimen.  Hypercoagulable workup never initiated. Notable R>L swelling of lower extremities on admission. CT with femoropopliteal DVT; possibly septic thrombophlebitis, more likely chronic DVT. Previous LE US from Oct 2023 positive for L-sided posterior tibial DVT.     -continue home eliquis  -consider lower extremity US to assess clot burden        VTE Risk Mitigation (From admission, onward)           Ordered     apixaban tablet 5 mg  2 times daily         01/12/24 0229                     Terrance Walsh MD  Department of Hospital Medicine  [facility]

## 2024-01-12 NOTE — CARE UPDATE
Will continue attempts to contact patient.  She has a known DVT but was not adherent with her apixaban per what can be gathered.  US here confirmed this.  Patient left without warning before assessment by a physician.  Looking at her H&P I suspect she had capacity to make this decision.      Silver Prado M.D.  [facility]  Pager [phone]

## 2025-06-11 ENCOUNTER — HOSPITAL ENCOUNTER (EMERGENCY)
Facility: HOSPITAL | Age: 45
Discharge: HOME | End: 2025-06-11
Attending: STUDENT IN AN ORGANIZED HEALTH CARE EDUCATION/TRAINING PROGRAM
Payer: MEDICAID

## 2025-06-11 VITALS
HEART RATE: 73 BPM | OXYGEN SATURATION: 100 % | DIASTOLIC BLOOD PRESSURE: 101 MMHG | WEIGHT: 185 LBS | TEMPERATURE: 98 F | HEIGHT: 64 IN | BODY MASS INDEX: 31.58 KG/M2 | SYSTOLIC BLOOD PRESSURE: 197 MMHG | RESPIRATION RATE: 16 BRPM

## 2025-06-11 DIAGNOSIS — N76.0 BACTERIAL VAGINOSIS: Primary | ICD-10-CM

## 2025-06-11 DIAGNOSIS — B96.89 BACTERIAL VAGINOSIS: Primary | ICD-10-CM

## 2025-06-11 DIAGNOSIS — N30.00 ACUTE CYSTITIS WITHOUT HEMATURIA: ICD-10-CM

## 2025-06-11 LAB
B-HCG UR QL: NEGATIVE
BACTERIA #/AREA URNS AUTO: ABNORMAL /HPF
BACTERIA GENITAL QL WET PREP: ABNORMAL
BILIRUB UR QL STRIP.AUTO: NEGATIVE
C TRACH DNA SPEC QL NAA+PROBE: NOT DETECTED
CLARITY UR: ABNORMAL
CLUE CELLS VAG QL WET PREP: ABNORMAL
COLOR UR AUTO: YELLOW
CTGC SOURCE (OHS) ORD-325: NORMAL
CTP QC/QA: YES
FILAMENT FUNGI VAG WET PREP-#/AREA: ABNORMAL
GLUCOSE UR QL STRIP: NEGATIVE
HCV AB SERPL QL IA: NORMAL
HGB UR QL STRIP: NEGATIVE
HIV 1+2 AB+HIV1 P24 AG SERPL QL IA: NORMAL
HOLD SPECIMEN: NORMAL
HOLD SPECIMEN: NORMAL
HYALINE CASTS UR QL AUTO: 0 /LPF (ref 0–1)
KETONES UR QL STRIP: NEGATIVE
LEUKOCYTE ESTERASE UR QL STRIP: ABNORMAL
MICROSCOPIC COMMENT: ABNORMAL
N GONORRHOEA DNA UR QL NAA+PROBE: NOT DETECTED
NITRITE UR QL STRIP: NEGATIVE
PH UR STRIP: 6 [PH]
PROT UR QL STRIP: ABNORMAL
RBC #/AREA URNS AUTO: <1 /HPF (ref 0–4)
SP GR UR STRIP: 1.02
SQUAMOUS #/AREA URNS AUTO: <1 /HPF
T VAGINALIS GENITAL QL WET PREP: ABNORMAL
UROBILINOGEN UR STRIP-ACNC: NEGATIVE EU/DL
WBC #/AREA URNS AUTO: 32 /HPF (ref 0–5)
WBC #/AREA VAG WET PREP: ABNORMAL
YEAST GENITAL QL WET PREP: ABNORMAL

## 2025-06-11 PROCEDURE — 87389 HIV-1 AG W/HIV-1&-2 AB AG IA: CPT | Performed by: PHYSICIAN ASSISTANT

## 2025-06-11 PROCEDURE — 81003 URINALYSIS AUTO W/O SCOPE: CPT | Performed by: EMERGENCY MEDICINE

## 2025-06-11 PROCEDURE — 25000003 PHARM REV CODE 250: Performed by: PHYSICIAN ASSISTANT

## 2025-06-11 PROCEDURE — 87086 URINE CULTURE/COLONY COUNT: CPT | Performed by: EMERGENCY MEDICINE

## 2025-06-11 PROCEDURE — 87210 SMEAR WET MOUNT SALINE/INK: CPT | Performed by: PHYSICIAN ASSISTANT

## 2025-06-11 PROCEDURE — 87591 N.GONORRHOEAE DNA AMP PROB: CPT | Performed by: PHYSICIAN ASSISTANT

## 2025-06-11 PROCEDURE — 81025 URINE PREGNANCY TEST: CPT | Performed by: EMERGENCY MEDICINE

## 2025-06-11 PROCEDURE — 99284 EMERGENCY DEPT VISIT MOD MDM: CPT

## 2025-06-11 PROCEDURE — 86803 HEPATITIS C AB TEST: CPT | Performed by: PHYSICIAN ASSISTANT

## 2025-06-11 RX ORDER — ONDANSETRON HYDROCHLORIDE 2 MG/ML
4 INJECTION, SOLUTION INTRAVENOUS
Status: DISCONTINUED | OUTPATIENT
Start: 2025-06-11 | End: 2025-06-11

## 2025-06-11 RX ORDER — METRONIDAZOLE 500 MG/1
500 TABLET ORAL EVERY 12 HOURS
Qty: 14 TABLET | Refills: 0 | Status: SHIPPED | OUTPATIENT
Start: 2025-06-11 | End: 2025-06-18

## 2025-06-11 RX ORDER — METRONIDAZOLE 500 MG/1
500 TABLET ORAL
Status: COMPLETED | OUTPATIENT
Start: 2025-06-11 | End: 2025-06-11

## 2025-06-11 RX ORDER — ONDANSETRON 4 MG/1
4 TABLET, ORALLY DISINTEGRATING ORAL
Status: COMPLETED | OUTPATIENT
Start: 2025-06-11 | End: 2025-06-11

## 2025-06-11 RX ORDER — ONDANSETRON 4 MG/1
4 TABLET, ORALLY DISINTEGRATING ORAL EVERY 8 HOURS PRN
Qty: 12 TABLET | Refills: 0 | Status: SHIPPED | OUTPATIENT
Start: 2025-06-11

## 2025-06-11 RX ORDER — CEPHALEXIN 500 MG/1
500 CAPSULE ORAL EVERY 6 HOURS
Qty: 20 CAPSULE | Refills: 0 | Status: SHIPPED | OUTPATIENT
Start: 2025-06-11 | End: 2025-06-16

## 2025-06-11 RX ORDER — CEPHALEXIN 500 MG/1
500 CAPSULE ORAL
Status: COMPLETED | OUTPATIENT
Start: 2025-06-11 | End: 2025-06-11

## 2025-06-11 RX ADMIN — CEPHALEXIN 500 MG: 500 CAPSULE ORAL at 08:06

## 2025-06-11 RX ADMIN — METRONIDAZOLE 500 MG: 500 TABLET ORAL at 08:06

## 2025-06-11 RX ADMIN — ONDANSETRON 4 MG: 4 TABLET, ORALLY DISINTEGRATING ORAL at 08:06

## 2025-06-11 NOTE — ED TRIAGE NOTES
Raheem Hernandes, a 44 y.o. female presents to the ED w/ complaint of pain urination x1 week and vaginal discharge x2 weeks.

## 2025-06-11 NOTE — ED PROVIDER NOTES
Encounter Date: 6/11/2025       History     Chief Complaint   Patient presents with    Exposure to STD     Pt states she would like an STD and HIV test due to burning when she urinates, denies discharge     44-year-old female with history of DVT who presents to the emergency department with chief complaint of dysuria and vaginal discharge that started about 1 week ago.  Reports clear vaginal discharge.  She is concerned about sexually transmitted infections.  States that she thinks her partner is sleeping with other people.  She denies any known exposure.  She denies abdominal pain, nausea, vomiting, diarrhea, fever.  She denies other worsening or alleviating factors.      Review of patient's allergies indicates:  No Known Allergies  Past Medical History:   Diagnosis Date    DVT (deep venous thrombosis)     Other pulmonary embolism without acute cor pulmonale      History reviewed. No pertinent surgical history.  No family history on file.  Social History[1]  Review of Systems   Genitourinary:  Positive for dysuria and vaginal discharge.       Physical Exam     Initial Vitals [06/11/25 0624]   BP Pulse Resp Temp SpO2   (!) 217/85 108 18 98.2 °F (36.8 °C) 98 %      MAP       --         Physical Exam    Vitals reviewed.  Constitutional: She appears well-developed and well-nourished. She is not diaphoretic. No distress.   HENT:   Head: Normocephalic and atraumatic. Mouth/Throat: Oropharynx is clear and moist.   Eyes: Conjunctivae and EOM are normal. Pupils are equal, round, and reactive to light.   Neck: Neck supple.   Normal range of motion.  Cardiovascular:  Normal rate, regular rhythm, normal heart sounds and intact distal pulses.     Exam reveals no gallop and no friction rub.       No murmur heard.  Pulmonary/Chest: Breath sounds normal. She has no wheezes. She has no rhonchi. She has no rales.   Abdominal: Abdomen is soft. Bowel sounds are normal. There is no abdominal tenderness.   Genitourinary:     Genitourinary Comments: Pelvic exam performed with nursing chaperone present   Scant amount of white discharge in vaginal vault.  No cervical lesions.  No cervical motion tenderness or adnexal tenderness palpation bilaterally.     Musculoskeletal:         General: Normal range of motion.      Cervical back: Normal range of motion and neck supple.     Neurological: She is alert and oriented to person, place, and time. She has normal strength. No cranial nerve deficit or sensory deficit. GCS score is 15. GCS eye subscore is 4. GCS verbal subscore is 5. GCS motor subscore is 6.   Skin: Skin is warm and dry. Capillary refill takes less than 2 seconds.   Psychiatric: She has a normal mood and affect. Her behavior is normal. Judgment and thought content normal.         ED Course   Procedures  Labs Reviewed   WET PREP, GENITAL - Abnormal       Result Value    WBC Rare (*)     Bacteria Many (*)     Clue Cells Rare (*)     TRICHOMONAS  None      BUDDING YEAST None      FUNGAL HYPHAE None     URINALYSIS, REFLEX TO URINE CULTURE - Abnormal    Color, UA Yellow      Appearance, UA Hazy (*)     pH, UA 6.0      Spec Grav UA 1.020      Protein, UA 1+ (*)     Glucose, UA Negative      Ketones, UA Negative      Bilirubin, UA Negative      Blood, UA Negative      Nitrites, UA Negative      Urobilinogen, UA Negative      Leukocyte Esterase, UA 1+ (*)    URINALYSIS MICROSCOPIC - Abnormal    RBC, UA <1      WBC, UA 32 (*)     Bacteria, UA Moderate (*)     Squamous Epithelial Cells, UA <1      Hyaline Casts, UA 0      Microscopic Comment       HEPATITIS C ANTIBODY - Normal    Hep C Ab Interp Non-Reactive     HIV 1 / 2 ANTIBODY - Normal    HIV 1/2 Ag/Ab Non-Reactive     CULTURE, URINE   HEP C VIRUS HOLD SPECIMEN   C. TRACHOMATIS/N. GONORRHOEAE BY AMP DNA   GREY TOP URINE HOLD   POCT URINE PREGNANCY    POC Preg Test, Ur Negative       Acceptable Yes            Imaging Results    None          Medications   metroNIDAZOLE  tablet 500 mg (500 mg Oral Given 6/11/25 0836)   cephALEXin capsule 500 mg (500 mg Oral Given 6/11/25 0836)   ondansetron disintegrating tablet 4 mg (4 mg Oral Given 6/11/25 0836)     Medical Decision Making  Emergent evaluation of a 44 y.o. female presenting to the emergency department complaining of dysuria and clear vaginal discharge.  She is concerned that her partner is sleeping with other people and would like to be tested for sexually transmitted infections.. Patient is afebrile, hemodynamically stable, and non toxic appearing.   Will order UA and perform pelvic exam.    Differential diagnosis includes but isn't limited to dysuria, yeast infection, bacterial vaginosis, STI, PID.     UA concerning for infection.  Will treat with Keflex.  UPT negative.  Clue cells on vaginal swab.  Treat with Flagyl.  Gonorrhea and chlamydia pending.  Will call with positive results.  This patient has asymptomatic HTN.  There is no severe HA, visual changes/disturbances, confusion/signs of encephalopathy, chest pain or SOB.  Per the most recent [facility] clinical policy, there is no indication for an emergent work-up or treatment in the ED.  The risks of emergently lowering of the pt's BP (iatrogenic stroke/hypotension) would outweigh any potential benefits.   Most recent Scientific Statement from the [employer]HA (May 2024) also supports the strategy of avoiding the emergent lowering of asymptomatic hypertension.      Patient to f/u in outpatient setting with PCP for further management of HTN.  Patient to be given strict ED return precautions.    The patient was instructed to follow up with a primary care provider and gyn or to return to the emergency department for worsening symptoms. The treatment plan was discussed with the patient who demonstrated understanding and comfort with plan.     Amount and/or Complexity of Data Reviewed  Labs: ordered.    Risk  Prescription drug management.                                      Clinical  Impression:  Final diagnoses:  [N76.0, B96.89] Bacterial vaginosis (Primary)  [N30.00] Acute cystitis without hematuria          ED Disposition Condition    Discharge Stable          ED Prescriptions       Medication Sig Dispense Start Date End Date Auth. Provider    cephALEXin (KEFLEX) 500 MG capsule Take 1 capsule (500 mg total) by mouth every 6 (six) hours. for 5 days 20 capsule 6/11/2025 6/16/2025 Nicolle Disla PA-C    metroNIDAZOLE (FLAGYL) 500 MG tablet Take 1 tablet (500 mg total) by mouth every 12 (twelve) hours. for 7 days 14 tablet 6/11/2025 6/18/2025 Nicolle Disla PA-C    ondansetron (ZOFRAN-ODT) 4 MG TbDL Take 1 tablet (4 mg total) by mouth every 8 (eight) hours as needed (nausea). 12 tablet 6/11/2025 -- Nicolle Disla PA-C          Follow-up Information       Follow up With Specialties Details Why Contact Info Additional Information    [facility] - Emergency Dept Emergency Medicine Go to  If symptoms worsen [address] [id number] [phone]     [facility] Internal Medicine Schedule an appointment as soon as possible for a visit   [address] [phone] [facility] Please park in surface lot and check in at central registration desk                 Nicolle Disla PA-C  06/11/25 0854         [1]   Social History  Tobacco Use    Smoking status: Never    Smokeless tobacco: Never   Substance Use Topics    Alcohol use: Not Currently    Drug use: Never        Nicolle Disla PA-C  06/11/25 0858

## 2025-06-11 NOTE — ED TRIAGE NOTES
Raheem Hernandes, a 44 y.o. female presents to the ED w/ complaint of burning and pain with urination and is concerned for STD and would like to be tested    Triage note:  Chief Complaint   Patient presents with    Exposure to STD     Pt states she would like an STD and HIV test due to burning when she urinates, denies discharge     Review of patient's allergies indicates:  No Known Allergies  Past Medical History:   Diagnosis Date    DVT (deep venous thrombosis)     Other pulmonary embolism without acute cor pulmonale

## 2025-06-11 NOTE — DISCHARGE INSTRUCTIONS
Take Keflex as prescribed and to completion for your urinary tract infection.  Take Flagyl as prescribed and to completion for bacterial vaginosis.  Take Zofran as needed for nausea.  Follow up with your primary doctor and gynecology or return to the emergency department sooner for any new or worsening symptoms.

## 2025-06-12 LAB — BACTERIA UR CULT: NORMAL
